# Patient Record
Sex: MALE | Race: BLACK OR AFRICAN AMERICAN | URBAN - METROPOLITAN AREA
[De-identification: names, ages, dates, MRNs, and addresses within clinical notes are randomized per-mention and may not be internally consistent; named-entity substitution may affect disease eponyms.]

---

## 2022-10-19 ENCOUNTER — OFFICE VISIT (OUTPATIENT)
Dept: NEUROLOGY | Age: 72
End: 2022-10-19
Payer: MEDICARE

## 2022-10-19 VITALS
OXYGEN SATURATION: 100 % | SYSTOLIC BLOOD PRESSURE: 111 MMHG | RESPIRATION RATE: 86 BRPM | HEART RATE: 88 BPM | DIASTOLIC BLOOD PRESSURE: 59 MMHG | WEIGHT: 163 LBS | BODY MASS INDEX: 20.27 KG/M2 | HEIGHT: 75 IN

## 2022-10-19 DIAGNOSIS — G20 PARKINSON DISEASE (HCC): Primary | ICD-10-CM

## 2022-10-19 PROCEDURE — 99204 OFFICE O/P NEW MOD 45 MIN: CPT | Performed by: PSYCHIATRY & NEUROLOGY

## 2022-10-19 PROCEDURE — 1123F ACP DISCUSS/DSCN MKR DOCD: CPT | Performed by: PSYCHIATRY & NEUROLOGY

## 2022-10-19 RX ORDER — VALSARTAN 80 MG/1
180 TABLET ORAL DAILY
COMMUNITY
Start: 2022-10-04

## 2022-10-19 RX ORDER — PIOGLITAZONEHYDROCHLORIDE 15 MG/1
15 TABLET ORAL DAILY
COMMUNITY
Start: 2022-10-04

## 2022-10-19 RX ORDER — PRAVASTATIN SODIUM 20 MG/1
20 TABLET ORAL
COMMUNITY
Start: 2022-10-15

## 2022-10-19 RX ORDER — TAMSULOSIN HYDROCHLORIDE 0.4 MG/1
0.4 CAPSULE ORAL 2 TIMES DAILY
COMMUNITY
Start: 2022-10-03

## 2022-10-19 RX ORDER — CARBIDOPA AND LEVODOPA 25; 100 MG/1; MG/1
TABLET ORAL
Qty: 90 TABLET | Refills: 3 | Status: SHIPPED | OUTPATIENT
Start: 2022-10-19

## 2022-10-19 RX ORDER — METFORMIN HYDROCHLORIDE 1000 MG/1
1000 TABLET ORAL 2 TIMES DAILY
COMMUNITY
Start: 2022-10-04

## 2022-10-19 NOTE — PATIENT INSTRUCTIONS
RESULT POLICY      If we have ordered testing for you, know that; \"NO NEWS IS GOOD NEWS! \"     It is our policy that we no longer call patients with results, nor do we  give test results over the phone. We schedule follow up appointments so that your results can be discussed in person. This allows you to address any questions you have regarding the results. If you choose to go to an imaging center outside of [facility], it is your responsibility to bring imaging report and disc to follow up appointment. If something of concern is revealed on your test, we will contact you to discuss the matter and if needed schedule a sooner follow up appointment. Additionally, results may be found by using the My Chart feature and one of our patient service representatives at the  can give you instructions on how to access this feature to utilize our electronic medical record system. Thank you for your understanding. [facility]   Statement to Patients  April 1, 2014      In an effort to ensure the large volume of patient prescription refills is processed in the most efficient and expeditious manner, we are asking our patients to assist us by calling your Pharmacy for all prescription refills, this will include also your  Mail Order Pharmacy. The pharmacy will contact our office electronically to continue the refill process. Please do not wait until the last minute to call your pharmacy. We need at least 48 hours (2days) to fill prescriptions. We also encourage you to call your pharmacy before going to  your prescription to make sure it is ready. With regard to controlled substance prescription refill requests (narcotic refills) that need to be picked up at our office, we ask your cooperation by providing us with at least 72 hours (3days) notice that you will need a refill.     We will not refill narcotic prescription refill requests after 4:00pm on any weekday, Monday through Thursday, or after 2:00pm on Fridays, or on the weekends. We encourage everyone to explore another way of getting your prescription refill request processed using [employer], our patient web portal through our electronic medical record system. [employer] is an efficient and effective way to communicate your medication request directly to the office and  downloadable as an felix on your smart phone . [employer] also features a review functionality that allows you to view your medication list as well as leave messages for your physician. Are you ready to get connected? If so please review the attatched instructions or speak to any of our staff to get you set up right away! Thank you so much for your cooperation. Should you have any questions please contact our Practice Administrator.

## 2022-10-19 NOTE — PROGRESS NOTES
[facility] and [address] at [facility] at [facility] [address], [address] [place] [id number] [address]  [phone] Office  [phone] Facsimile           Referring: Gwynne Buerger, MD      Chief Complaint   Patient presents with    New Patient    Tremors     Mainly right hand for about 1 yr. Has come close to falling at times. Excess drooling, difficulty with word finding or long pauses when speaking     75-year-old gentleman who presents today company by his wife for initial neurologic consultation regarding tremor. For greater than a year now he has noticed tremor in his right hand and shaking. Also having drooling. His wife notes that he actually could feel these before she could actually see them but they have become progressively worse. He has difficulty with buttons and is wearing closed now that do not have buttons for the most part. His writing has become very small. He has become hypophonic. He says he has to work to get out what he wants to say. His wife has noticed a change in his facial expression and she notes that he just appears sad. He walks like he is ready to trip and fall over she says. He is bradykinetic. His wife now has to bathe him and when she tries to get him to help he says that he cannot move as quick as she would like him to do. No medications have been tried. No family history of such. No toxic exposures. No imaging. Past Medical History:   Diagnosis Date    Diabetes ([address]     Essential hypertension     Hyperlipidemia     Kidney stone     Prostate cancer (HCC)     Tremor        Past Surgical History:   Procedure Laterality Date    HX LITHOTRIPSY         Current Outpatient Medications   Medication Sig Dispense Refill    metFORMIN (GLUCOPHAGE) 1,000 mg tablet Take 1,000 mg by mouth two (2) times a day.       pioglitazone (ACTOS) 15 mg tablet Take 15 mg by mouth daily. pravastatin (PRAVACHOL) 20 mg tablet Take 20 mg by mouth nightly. tamsulosin (FLOMAX) 0.4 mg capsule Take 0.4 mg by mouth two (2) times a day. valsartan (DIOVAN) 80 mg tablet Take 180 mg by mouth daily. Not on File    Social History     Tobacco Use    Smoking status: Former     Types: Cigarettes    Smokeless tobacco: Never   Vaping Use    Vaping Use: Never used   Substance Use Topics    Alcohol use: Not Currently    Drug use: Not Currently       History reviewed. No pertinent family history. Review of Systems  Pertinent positives and negatives as noted. Examination  Visit Vitals  BP (!) 111/59 (BP 1 Location: Left upper arm, BP Patient Position: Sitting, BP Cuff Size: Adult)   Pulse 88   Resp (!) 86   Ht 6' 3\" (1.905 m)   Wt 73.9 kg (163 lb)   SpO2 100%   BMI 20.37 kg/m²     He is a pleasant gentleman. He is awake alert and oriented. He is hypophonic speech. He has intact cranial nerves II-XII. No gaze limitation. Facies are masked. He has rest tremor of the right upper extremity with cogwheeling at the wrist.  Cogwheeling also at the left wrist.  He is quite bradykinetic. No pronation and no drift. Strength is full in the upper and lower extremities in all muscle groups to direct testing. Symmetrical reflexes. No pathologic reflex. No ataxia. He does have some drooling. He arises from the examination table unassisted. He is able to walk down the [place] with a fair stride and slight hunch. Tremor is accentuated with ambulation and he has no arm swing. Impression/Plan  77-year-old gentleman with symptoms and examination as above consistent with Parkinson's  We discussed what Parkinson's is and how we treated  Discussed pathophysiology  We will get an MRI of the brain and carotid Doppler to evaluate for potential basal ganglia stroke etc. that could be responsible, i.e vascular Parkinson's.   We will give him a trial of Sinemet titrating to a dose of 25/100, 1 tablet at 8 AM, 12 noon and 4 PM  I will see him back after his testing  Radha Esqueda MD          This note was created using voice recognition software. Despite editing, there may be syntax errors.

## 2022-10-31 ENCOUNTER — HOSPITAL ENCOUNTER (OUTPATIENT)
Dept: MRI IMAGING | Age: 72
Discharge: HOME | End: 2022-10-31
Attending: PSYCHIATRY & NEUROLOGY
Payer: MEDICARE

## 2022-10-31 DIAGNOSIS — G20 PARKINSON DISEASE (HCC): ICD-10-CM

## 2022-10-31 PROCEDURE — 70551 MRI BRAIN STEM W/O DYE: CPT

## 2022-11-04 DIAGNOSIS — R93.89 ABNORMAL MRI: Primary | ICD-10-CM

## 2022-11-04 NOTE — TELEPHONE ENCOUNTER
Referral sent to Dr. Linda Diallo due to significant sinus disease per request and written order from Dr. Claire Pickard on 11/4/22. Ordered MRI of cervical spine with and without due to nonspecific marrow heterogeneity at C4 vertebral body per the written request of Dr. Claire Pickard on 11/4/22     Pls tell patient he has significant sinus disease on MRI--refer to Dr. Héctor Wyman, ENT     He also has an area seen on cervical spine that radiologist would like a better look--order MRI cervical with and without     Thanks     SE     Called and spoke with both patient and his wife concerning the orders placed and discussed per Dr. Claire Pickard note. Patient stated understanding.

## 2022-11-22 ENCOUNTER — OFFICE VISIT (OUTPATIENT)
Dept: NEUROLOGY | Age: 72
End: 2022-11-22
Payer: MEDICARE

## 2022-11-22 VITALS — RESPIRATION RATE: 20 BRPM | SYSTOLIC BLOOD PRESSURE: 110 MMHG | DIASTOLIC BLOOD PRESSURE: 76 MMHG

## 2022-11-22 DIAGNOSIS — G20 PARKINSON DISEASE (HCC): Primary | ICD-10-CM

## 2022-11-22 PROCEDURE — 99213 OFFICE O/P EST LOW 20 MIN: CPT

## 2022-11-22 PROCEDURE — 1123F ACP DISCUSS/DSCN MKR DOCD: CPT

## 2022-11-22 NOTE — PROGRESS NOTES
Simona Cuevas is a 67 y.o. male who presents with the following  Chief Complaint   Patient presents with    Follow-up     Test results        HPI  Patient here with his wife for Parkinson's follow-up. Patient last seen by Dr. Sade Leiva on October 19, 2022 for tremors mainly in the right hand for about a year, balance issues, excess drooling, difficulty with word finding, small writing, bradykinetic, and masked facies. After seeing this patient Dr. Sade Leiva ordered an MRI of the brain and carotid Dopplers. Carotid Dopplers showed less than 50% stenosis on the right and left. The MRI showed:    1. Chronic microvascular ischemic disease with no acute infarction. 2. Heterogeneous signal fills the left maxillary sinus, maxillary ostium and ethmoid sinus. Could be better assessed with dedicated sinus CT as clinically indicated. 3. Nonspecific marrow heterogeneity C4 vertebral body partly visualized. This could be better assessed with dedicated cervical spine MRI on a non emergent basis. Due to these results Dr. Sade Leiva had his nurse refer the patient to Dr. Army Mckeon for significant sinus disease and placed an order for a c spine MRI due to the heterogeneous findings. Patient's wife states that neither one of these have been scheduled yet but that they are aware of them. Patient has been taking the Sinemet as prescribed and he and his wife both feel that the medication is certainly helping him. They feel there has been an improvement in his overall stiffness, desire and motivation to do things, excessive drooling, and word finding. His wife says that he has actually initiated calling his sons on the phone again. He has not had any side effects to the medication. They state that his right hand tremor is about the same as it was before starting the medication. Patient's wife is interested in him starting some physical therapy.   [facility] Parkinson's patients and she was very interested in this for him. Not on File    Current Outpatient Medications   Medication Sig    metFORMIN (GLUCOPHAGE) 1,000 mg tablet Take 1,000 mg by mouth two (2) times a day. pioglitazone (ACTOS) 15 mg tablet Take 15 mg by mouth daily. tamsulosin (FLOMAX) 0.4 mg capsule Take 0.4 mg by mouth two (2) times a day. valsartan (DIOVAN) 80 mg tablet Take 180 mg by mouth daily. carbidopa-levodopa (Sinemet)  mg per tablet 1/2 tab po at 8am, 12noon and 4pm x 1 week then 1 tab po at same intervals thereafter (Patient taking differently: Take 1 Tablet by mouth three (3) times daily. 1 tab po at same intervals thereafter)    pravastatin (PRAVACHOL) 20 mg tablet Take 20 mg by mouth nightly. No current facility-administered medications for this visit. Social History     Tobacco Use   Smoking Status Former    Types: Cigarettes   Smokeless Tobacco Never       Past Medical History:   Diagnosis Date    Diabetes ([facility] [address]     Essential hypertension     Hyperlipidemia     Kidney stone     Prostate cancer ([facility] [address]     Tremor        Past Surgical History:   Procedure Laterality Date    HX LITHOTRIPSY         History reviewed. No pertinent family history. Social History     Socioeconomic History    Marital status:    Tobacco Use    Smoking status: Former     Types: Cigarettes    Smokeless tobacco: Never   Vaping Use    Vaping Use: Never used   Substance and Sexual Activity    Alcohol use: Not Currently    Drug use: Not Currently       Review of Systems   Constitutional: Negative. Musculoskeletal:  Negative for falls. Neurological:  Positive for tremors. Remainder of comprehensive review is negative. Physical Exam :    Visit Vitals  /76 (BP 1 Location: Left upper arm, BP Patient Position: Sitting, BP Cuff Size: Adult)   Resp 20       General: Well defined, nourished, and groomed individual in no acute distress.     Neck: Supple, nontender, no bruits, no pain with resistance to active range of motion. Musculoskeletal: Extremities revealed no edema and had full range of motion of joints. Psych: Good mood and bright affect    NEUROLOGICAL EXAMINATION:    Mental Status: Alert and oriented to person, place, and time    Cranial Nerves:    II, III, IV, VI: Visual acuity grossly intact. Visual fields are normal.    Pupils are equal, round, and reactive to light and accommodation. Extra-ocular movements are full and fluid. Fundoscopic exam was benign, no ptosis or nystagmus. V-XII: Hearing is grossly intact. Facial features are symmetric, with normal sensation and strength. The palate rises symmetrically and the tongue protrudes midline. Motor Examination: Normal tone, bulk, and strength, 5/5 muscle strength throughout. Coordination: Finger to nose was normal. Right hand resting tremor. Gait and Station: Did not observe  Reflexes: DTRs 2+ throughout. No results found for this or any previous visit. Orders Placed This Encounter    REFERRAL TO PHYSICAL THERAPY     Referral Priority:   Routine     Referral Type:   PT/OT/ST     Referral Reason:   Specialty Services Required     Requested Specialty:   Physical Therapy     Number of Visits Requested:   1       1. Parkinson disease ([facility] [address]    Will refer to physical therapy [facility] program for Parkinson's patients. Continue taking Sinemet 25/100 mg at 8, 12, 4 each day. Follow-up after physical therapy is complete.           This note will not be viewable in [employer]

## 2022-11-22 NOTE — PROGRESS NOTES
Has helped, even the drooling, and desire to do different things, except for talking on the phone   Maybe even his mood slighly

## 2023-02-14 RX ORDER — CARBIDOPA AND LEVODOPA 25; 100 MG/1; MG/1
TABLET ORAL
Qty: 90 TABLET | Refills: 3 | Status: SHIPPED | OUTPATIENT
Start: 2023-02-14

## 2023-02-15 ENCOUNTER — HOSPITAL ENCOUNTER (OUTPATIENT)
Dept: MRI IMAGING | Age: 73
Discharge: HOME | End: 2023-02-15
Attending: PSYCHIATRY & NEUROLOGY
Payer: MEDICARE

## 2023-02-15 DIAGNOSIS — R93.89 ABNORMAL MRI: ICD-10-CM

## 2023-02-15 PROCEDURE — 72156 MRI NECK SPINE W/O & W/DYE: CPT

## 2023-02-15 PROCEDURE — A9576 INJ PROHANCE MULTIPACK: HCPCS | Performed by: PSYCHIATRY & NEUROLOGY

## 2023-02-15 PROCEDURE — 74011250636 HC RX REV CODE- 250/636: Performed by: PSYCHIATRY & NEUROLOGY

## 2023-02-15 RX ADMIN — GADOTERIDOL 15 ML: 279.3 INJECTION, SOLUTION INTRAVENOUS at 10:59

## 2023-04-06 ENCOUNTER — OFFICE VISIT (OUTPATIENT)
Dept: NEUROLOGY | Age: 73
End: 2023-04-06
Payer: MEDICARE

## 2023-04-06 PROCEDURE — 1101F PT FALLS ASSESS-DOCD LE1/YR: CPT

## 2023-04-06 PROCEDURE — 99214 OFFICE O/P EST MOD 30 MIN: CPT

## 2023-04-06 PROCEDURE — 3017F COLORECTAL CA SCREEN DOC REV: CPT

## 2023-04-06 PROCEDURE — 1123F ACP DISCUSS/DSCN MKR DOCD: CPT

## 2023-04-06 PROCEDURE — G8536 NO DOC ELDER MAL SCRN: HCPCS

## 2023-04-06 PROCEDURE — G8427 DOCREV CUR MEDS BY ELIG CLIN: HCPCS

## 2023-04-06 PROCEDURE — G8432 DEP SCR NOT DOC, RNG: HCPCS

## 2023-04-06 PROCEDURE — G8420 CALC BMI NORM PARAMETERS: HCPCS

## 2023-04-06 NOTE — PROGRESS NOTES
Lidia Engel is a 67 y.o. male who presents with the following  Chief Complaint   Patient presents with    Follow-up     After therapy and medication evaluation      Last office visit note  HPI  Patient here with his wife for Parkinson's follow-up. Patient last seen by Dr. Morales Steinberg on October 19, 2022 for tremors mainly in the right hand for about a year, balance issues, excess drooling, difficulty with word finding, small writing, bradykinetic, and masked facies. After seeing this patient Dr. Morales Steinberg ordered an MRI of the brain and carotid Dopplers. Carotid Dopplers showed less than 50% stenosis on the right and left. The MRI showed:     1. Chronic microvascular ischemic disease with no acute infarction. 2. Heterogeneous signal fills the left maxillary sinus, maxillary ostium and ethmoid sinus. Could be better assessed with dedicated sinus CT as clinically indicated. 3. Nonspecific marrow heterogeneity C4 vertebral body partly visualized. This could be better assessed with dedicated cervical spine MRI on a non emergent basis. Due to these results Dr. Morales Steinberg had his nurse refer the patient to Dr. Jeanna Lin for significant sinus disease and placed an order for a c spine MRI due to the heterogeneous findings. Patient's wife states that neither one of these have been scheduled yet but that they are aware of them. Patient has been taking the Sinemet as prescribed and he and his wife both feel that the medication is certainly helping him. They feel there has been an improvement in his overall stiffness, desire and motivation to do things, excessive drooling, and word finding. His wife says that he has actually initiated calling his sons on the phone again. He has not had any side effects to the medication. They state that his right hand tremor is about the same as it was before starting the medication. Patient's wife is interested in him starting some physical therapy.   Discussed with them the [facility] big and loud program for Parkinson's patients and she was very interested in this for him. Will refer to physical therapy specifically [facility] big and loud program for Parkinson's patients. Continue taking Sinemet 25/100 mg at 8, 12, 4 each day. Follow-up after physical therapy is complete. Today's office visit note  HPI:  Patient is here today with his wife for Parkinson's follow-up. Patient's wife states that the patient is still seeing the improvement with her 's condition she states that especially there is less drooling than there was before. She even feels that his tremors are a little better than they used to be. Patient is tolerating the Sinemet well although he is having vivid dreams, they seem to be good dreams as he is laughing during them. Since their last visit here patient had an MRI of the C-spine that Dr. Courtney Martinez ordered that showed:  IMPRESSION  1. Multilevel degenerative change detailed by level above most significant at C4-5 and C5-6  2. Marrow signal is heterogeneous with endplate degenerative change but no marrow replacement     Patient never did get to physical therapy. Wife stated that she was never contacted by [facility]. She also wanted to wait until they did the MRI of the cervical spine before he participated in therapy. Patient would like a copy of the cervical MRI to take to physical therapy when he does start. Patient wanted to know if his dose of Sinemet could be increased at his visit today. Not on File    Current Outpatient Medications   Medication Sig    carbidopa-levodopa (SINEMET)  mg per tablet TAKE 1 TAB BY MOUTH AT 8AM, 12NOON, 4PM, 8PM  Indications: a type of movement disorder called parkinsonism    metFORMIN (GLUCOPHAGE) 1,000 mg tablet Take 1 Tablet by mouth two (2) times a day. pioglitazone (ACTOS) 15 mg tablet Take 1 Tablet by mouth daily. pravastatin (PRAVACHOL) 20 mg tablet Take 1 Tablet by mouth nightly. tamsulosin (FLOMAX) 0.4 mg capsule Take 1 Capsule by mouth two (2) times a day. valsartan (DIOVAN) 80 mg tablet Take 180 mg by mouth daily. No current facility-administered medications for this visit. Social History     Tobacco Use   Smoking Status Former    Types: Cigarettes   Smokeless Tobacco Never       Past Medical History:   Diagnosis Date    Diabetes ([facility] [address]     Essential hypertension     Hyperlipidemia     Kidney stone     Prostate cancer ([facility] [address]     Tremor        Past Surgical History:   Procedure Laterality Date    HX LITHOTRIPSY         No family history on file. Social History     Socioeconomic History    Marital status:    Tobacco Use    Smoking status: Former     Types: Cigarettes    Smokeless tobacco: Never   Vaping Use    Vaping Use: Never used   Substance and Sexual Activity    Alcohol use: Not Currently    Drug use: Not Currently       Review of Systems   Musculoskeletal:  Negative for falls. Neurological:  Positive for tremors. Remainder of comprehensive review is negative. Physical Exam :    Visit Vitals  /66 (BP 1 Location: Left upper arm, BP Patient Position: Sitting, BP Cuff Size: Adult)   Pulse 89   Temp 97.1 °F (36.2 °C)   Resp 20   SpO2 97%       General: Well defined, nourished, and groomed individual in no acute distress. Neck: Supple, nontender, no bruits, no pain with resistance to active range of motion. Musculoskeletal: Extremities revealed no edema and had full range of motion of joints. Psych: Good mood and bright affect    NEUROLOGICAL EXAMINATION:    Mental Status: Alert and oriented to person, place, and time    Cranial Nerves:    II, III, IV, VI: Visual acuity grossly intact. Visual fields are normal.    Pupils are equal, round, and reactive to light and accommodation. Extra-ocular movements are full and fluid. Fundoscopic exam was benign, no ptosis or nystagmus. V-XII: Hearing is grossly intact.  Facial features are symmetric, with normal sensation and strength. The palate rises symmetrically and the tongue protrudes midline. Sternocleidomastoids 5/5. Motor Examination: Normal tone, bulk, and strength, 5/5 muscle strength throughout. Coordination: Finger to nose was normal. Right hand resting tremor. Gait and Station: Steady while walking. Normal arm swing. No pronator drift. No muscle wasting or fasiculations noted. Reflexes: DTRs 1+ throughout. No results found for this or any previous visit. Orders Placed This Encounter    carbidopa-levodopa (SINEMET)  mg per tablet     Sig: TAKE 1 TAB BY MOUTH AT 8AM, 12NOON, 4PM, 8PM  Indications: a type of movement disorder called parkinsonism     Dispense:  120 Tablet     Refill:  3       1. Parkinson disease ([facility] [address]      Per patient's request will increase the Sinemet dose from  mg by adding an 8:00 PM dose to his regimen. Patient will now take a dose at 8 AM 12 PM 4 PM and 8 PM.    We will resend the physical therapy referral to [facility] so that the patient can get into physical therapy ASAP. Advised the patient to return in about 3 months to assess the medication increase. Advised the patient's wife that if there is any issue with the medication in the meantime to send a [employer] message to let us know.           This note will not be viewable in [employer]

## 2023-04-06 NOTE — PROGRESS NOTES
Stated he never got a call for the therapy   Still taking the sinemet, tremors have almost stayed the same, drooling is better according to his wife, now he only notices it while he is eating and he will drool when he is asleep

## 2023-05-17 RX ORDER — PIOGLITAZONEHYDROCHLORIDE 15 MG/1
15 TABLET ORAL DAILY
COMMUNITY
Start: 2022-10-04

## 2023-05-17 RX ORDER — VALSARTAN 80 MG/1
180 TABLET ORAL DAILY
COMMUNITY
Start: 2022-10-04

## 2023-05-17 RX ORDER — TAMSULOSIN HYDROCHLORIDE 0.4 MG/1
0.4 CAPSULE ORAL 2 TIMES DAILY
COMMUNITY
Start: 2022-10-03

## 2023-05-17 RX ORDER — PRAVASTATIN SODIUM 20 MG
1 TABLET ORAL NIGHTLY
COMMUNITY
Start: 2022-10-15

## 2023-07-05 NOTE — PROGRESS NOTES
know.    Today's office visit note  HPI  Patient is here today with his wife for Parkinson's follow-up. At the last visit, the patient had seen improvement with Sinemet  times a day however wanted an increase in the medication to see if things could be better. We added an 8 PM dose to his regimen and today the patient and his wife feel that things are about the same even with the increase in the dose. They feel that things are no better but also no worse. Patient feels as if he has an increase in drooling with increase in water intake and still has a mild right hand tremor at rest.  Patient's wife is still seeing an improvement in motivation and word finding. He has not had any falls. He did not have physical therapy yet as they state that it has been a busy time for them but they are still interested and will give [facility] a call to get scheduled. No Known Allergies    Current Outpatient Medications   Medication Sig Dispense Refill    carbidopa-levodopa (SINEMET)  MG per tablet Take 1 1/2 pills 8 am, 12 pm, 4 pm and 1 pill at 8 pm daily 210 tablet 2    metFORMIN (GLUCOPHAGE) 1000 MG tablet Take 1 tablet by mouth 2 times daily      pioglitazone (ACTOS) 15 MG tablet Take 1 tablet by mouth daily      pravastatin (PRAVACHOL) 20 MG tablet Take 1 tablet by mouth nightly      tamsulosin (FLOMAX) 0.4 MG capsule Take 1 capsule by mouth 2 times daily      valsartan (DIOVAN) 80 MG tablet Take 2.25 tablets by mouth daily       No current facility-administered medications for this visit. Social History     Tobacco Use   Smoking Status Former   Smokeless Tobacco Never       Past Medical History:   Diagnosis Date    Diabetes ([address])     Essential hypertension     Hyperlipidemia     Kidney stone     Prostate cancer ([address])     Tremor        Past Surgical History:   Procedure Laterality Date    LITHOTRIPSY         No family history on file.     Social History     Socioeconomic History    Marital status:

## 2023-07-06 ENCOUNTER — OFFICE VISIT (OUTPATIENT)
Age: 73
End: 2023-07-06
Payer: MEDICARE

## 2023-07-06 VITALS
HEIGHT: 74 IN | DIASTOLIC BLOOD PRESSURE: 64 MMHG | WEIGHT: 163 LBS | SYSTOLIC BLOOD PRESSURE: 101 MMHG | HEART RATE: 73 BPM | BODY MASS INDEX: 20.92 KG/M2 | OXYGEN SATURATION: 97 % | RESPIRATION RATE: 18 BRPM

## 2023-07-06 DIAGNOSIS — G20 PARKINSON'S DISEASE (HCC): Primary | ICD-10-CM

## 2023-07-06 PROCEDURE — 4004F PT TOBACCO SCREEN RCVD TLK: CPT

## 2023-07-06 PROCEDURE — 99214 OFFICE O/P EST MOD 30 MIN: CPT

## 2023-07-06 PROCEDURE — 3017F COLORECTAL CA SCREEN DOC REV: CPT

## 2023-07-06 PROCEDURE — 1123F ACP DISCUSS/DSCN MKR DOCD: CPT

## 2023-07-06 PROCEDURE — G8420 CALC BMI NORM PARAMETERS: HCPCS

## 2023-07-06 PROCEDURE — G8427 DOCREV CUR MEDS BY ELIG CLIN: HCPCS

## 2023-07-06 ASSESSMENT — PATIENT HEALTH QUESTIONNAIRE - PHQ9
SUM OF ALL RESPONSES TO PHQ QUESTIONS 1-9: 2
SUM OF ALL RESPONSES TO PHQ QUESTIONS 1-9: 2
2. FEELING DOWN, DEPRESSED OR HOPELESS: 1
SUM OF ALL RESPONSES TO PHQ QUESTIONS 1-9: 2
1. LITTLE INTEREST OR PLEASURE IN DOING THINGS: 1
SUM OF ALL RESPONSES TO PHQ QUESTIONS 1-9: 2
SUM OF ALL RESPONSES TO PHQ9 QUESTIONS 1 & 2: 2

## 2023-09-07 DIAGNOSIS — G20 PARKINSON'S DISEASE (HCC): ICD-10-CM

## 2023-09-20 NOTE — PROGRESS NOTES
Susan Davalos is a 68 y.o. male who presents with the following  Chief Complaint   Patient presents with    Follow-up     Med gavin     Last office visit note  HPI  Patient is here today with his wife for Parkinson's follow-up. At the last visit, the patient had seen improvement with Sinemet  times a day however wanted an increase in the medication to see if things could be better. We added an 8 PM dose to his regimen and today the patient and his wife feel that things are about the same even with the increase in the dose. They feel that things are no better but also no worse. Patient feels as if he has an increase in drooling with increase in water intake and still has a mild right hand tremor at rest.  Patient's wife is still seeing an improvement in motivation and word finding. He has not had any falls. He did not have physical therapy yet as they state that it has been a busy time for them but they are still interested and will give [facility] a call to get scheduled. Will increase the patient's sentiment today from  mg 4 times a day to 1-1/2 tablets at 8 AM, 12 PM, 4 PM and 1 pill still at 8 PM.     Advised the patient and his wife to get in touch with [facility] for physical therapy. Follow-up in about 10 weeks for medication evaluation    Today's office visit note  HPI  Patient is here today with his wife for Parkinson's follow-up. Since the patient was here last he increase his Sinemet to 1-1/2 pills 3 times a day and 1 pill at 8 PM.  He has still not had any physical therapy for his weakness. Today the patient and his wife feel that after the increase of the medicine there is really still no change. Patient's wife states that the tremor in his right hand is worse in the morning and throughout the day if he is upset about something will increase. He is still having stiffness and sometimes feeling as though he is frozen especially when trying to sit down.

## 2023-09-21 ENCOUNTER — OFFICE VISIT (OUTPATIENT)
Age: 73
End: 2023-09-21
Payer: MEDICARE

## 2023-09-21 VITALS
OXYGEN SATURATION: 98 % | SYSTOLIC BLOOD PRESSURE: 124 MMHG | RESPIRATION RATE: 20 BRPM | DIASTOLIC BLOOD PRESSURE: 68 MMHG | HEART RATE: 85 BPM

## 2023-09-21 DIAGNOSIS — G20 PARKINSON'S DISEASE (HCC): Primary | ICD-10-CM

## 2023-09-21 PROCEDURE — 1123F ACP DISCUSS/DSCN MKR DOCD: CPT

## 2023-09-21 PROCEDURE — 3017F COLORECTAL CA SCREEN DOC REV: CPT

## 2023-09-21 PROCEDURE — 1036F TOBACCO NON-USER: CPT

## 2023-09-21 PROCEDURE — G8427 DOCREV CUR MEDS BY ELIG CLIN: HCPCS

## 2023-09-21 PROCEDURE — 99214 OFFICE O/P EST MOD 30 MIN: CPT

## 2023-09-21 PROCEDURE — G8420 CALC BMI NORM PARAMETERS: HCPCS

## 2023-09-21 NOTE — PROGRESS NOTES
No changes since his last visit   Certain days he looks real weak first thing in the morning but that is not every morning

## 2024-01-10 NOTE — PROGRESS NOTES
Christopher Whyte is a 73 y.o. male who presents with the following  Chief Complaint   Patient presents with    Follow-up     Med eval increased sinemet     Last office visit note  HPI  Patient is here today with his wife for Parkinson's follow-up.  Since the patient was here last he increase his Sinemet to 1-1/2 pills 3 times a day and 1 pill at 8 PM.  He has still not had any physical therapy for his weakness.     Today the patient and his wife feel that after the increase of the medicine there is really still no change.  Patient's wife states that the tremor in his right hand is worse in the morning and throughout the day if he is upset about something will increase.  He is still having stiffness and sometimes feeling as though he is frozen especially when trying to sit down.  Patient's wife states that he is very weak and struggles to get in and out of the bed.  He has had 1 fall since his last visit.  He says that he feels dizzy sometimes upon standing and if he is on his feet for a long period of time.  Patient's wife says that she feels like he is still doing very well with being social and having motivation.    Reiterated how important it is for the patient to have physical therapy for his weakness.  Patient's wife states that she agrees with this.  Not sure that the patient really feels that he wants to do it.  The referral that I made for [facility] in April for the [facility] program will be refaxed to them today so that he can build up his strength.     Spoke with Cesar Jmienez NP about this patient and he suggested that we increase his Sinemet to 2 pills 4 times a day to see if this helps with his stiffness, freezing,and tremor.     We will have the patient follow-up in 3 months to see how he is feeling with this change.      Today's office visit note  HPI  Patient is here today with his wife for Parkinson's follow-up.  Since the patient was here last, they increase the Sinemet dose to

## 2024-01-11 ENCOUNTER — OFFICE VISIT (OUTPATIENT)
Age: 74
End: 2024-01-11
Payer: MEDICARE

## 2024-01-11 VITALS
OXYGEN SATURATION: 94 % | DIASTOLIC BLOOD PRESSURE: 68 MMHG | SYSTOLIC BLOOD PRESSURE: 118 MMHG | RESPIRATION RATE: 20 BRPM

## 2024-01-11 DIAGNOSIS — G20.A1 PARKINSON'S DISEASE WITHOUT DYSKINESIA OR FLUCTUATING MANIFESTATIONS: Primary | ICD-10-CM

## 2024-01-11 PROCEDURE — G8484 FLU IMMUNIZE NO ADMIN: HCPCS

## 2024-01-11 PROCEDURE — 1123F ACP DISCUSS/DSCN MKR DOCD: CPT

## 2024-01-11 PROCEDURE — 1036F TOBACCO NON-USER: CPT

## 2024-01-11 PROCEDURE — G8420 CALC BMI NORM PARAMETERS: HCPCS

## 2024-01-11 PROCEDURE — G8428 CUR MEDS NOT DOCUMENT: HCPCS

## 2024-01-11 PROCEDURE — 99214 OFFICE O/P EST MOD 30 MIN: CPT

## 2024-01-11 PROCEDURE — 3017F COLORECTAL CA SCREEN DOC REV: CPT

## 2024-01-11 NOTE — PROGRESS NOTES
Drooling, and his social interaction has improved a whole lot   Overall over the length of time he has been coming she has noticed a difference in the medication   Not necessarily since the last visit just overall

## 2024-04-09 DIAGNOSIS — G20.A1 PARKINSON'S DISEASE (HCC): ICD-10-CM

## 2024-05-01 DIAGNOSIS — G20.A1 PARKINSON'S DISEASE (HCC): ICD-10-CM

## 2024-10-24 NOTE — PROGRESS NOTES
Christopher Whyte is a 74 y.o. male who presents with the following  Chief Complaint   Patient presents with    Follow-up     Parkinson's disease      Last office visit note  HPI  Patient is here today with his wife for Parkinson's follow-up.  Since the patient was here last, they increase the Sinemet dose to  mg 2 pills 4 times a day.  Patient says that he does not see any significant improvement in his symptoms however the patient's wife says that overall since he has been coming here being treated with the Sinemet she has seen a big change in his symptoms mostly including less drooling and with being more social.  The patient says he still feels very stiff all of the time and has trouble getting in and out of the bed still.  At his last visit we reiterated that he needed physical therapy and today the patient's wife states that the were able to attend about 4 or 5 PT sessions and was assessed by speech and was about to have an OT evaluation when the patient unfortunately had a serious UTI infection which stopped the visits.  The patient's wife says that the patient was doing well in physical therapy and enjoyed his visits.  She would like to get him reestablished soon.  Patient's wife says that physical therapy found that the patient's blood pressure was dropping while he was standing and that is likely the reason of his falls.  Since then, his doctor  stopped his valsartan about a month ago and he has not had any further falls.  Patient's wife says that since being on the sinemet the patient has been having vivid dreams.  She says sometimes he is laughing and they seem to be good dreams and sometimes he is thrashing around.  The patient states that he does not remember these dreams or any of this happening. He has prostate CA and is up a lot during the night to use the bathroom.     Patient will continue to take Sinemet  mg 2 pills 4 times a day at this time.     Urged the patient to get back into

## 2024-10-25 ENCOUNTER — OFFICE VISIT (OUTPATIENT)
Age: 74
End: 2024-10-25

## 2024-10-25 VITALS
SYSTOLIC BLOOD PRESSURE: 146 MMHG | OXYGEN SATURATION: 96 % | DIASTOLIC BLOOD PRESSURE: 84 MMHG | HEART RATE: 64 BPM | RESPIRATION RATE: 20 BRPM

## 2024-10-25 DIAGNOSIS — G20.A1 PARKINSON'S DISEASE WITHOUT DYSKINESIA OR FLUCTUATING MANIFESTATIONS (HCC): Primary | ICD-10-CM

## 2024-10-25 RX ORDER — FLUDROCORTISONE ACETATE 0.1 MG/1
0.1 TABLET ORAL
COMMUNITY
Start: 2024-01-27

## 2024-10-25 RX ORDER — CARBIDOPA AND LEVODOPA 25; 100 MG/1; MG/1
2 TABLET, EXTENDED RELEASE ORAL NIGHTLY
Qty: 60 TABLET | Refills: 3 | Status: SHIPPED | OUTPATIENT
Start: 2024-10-25

## 2024-10-25 RX ORDER — FINASTERIDE 5 MG/1
5 TABLET, FILM COATED ORAL DAILY
COMMUNITY
Start: 2024-09-20

## 2024-10-25 NOTE — PROGRESS NOTES
Parkinsons disease- had a couple of falls since last appt- one back in January BP dropping when he was standing admitted him to [facility]   PT/OT evaluated him while he was there     Confusion more in the morning until the 8 am dose   Only time he gets confused according to his wife   Falling has improved

## 2024-10-26 DIAGNOSIS — G20.A1 PARKINSON'S DISEASE (HCC): ICD-10-CM

## 2024-10-27 RX ORDER — CARBIDOPA AND LEVODOPA 25; 100 MG/1; MG/1
TABLET ORAL
Qty: 720 TABLET | Refills: 1 | Status: SHIPPED | OUTPATIENT
Start: 2024-10-27

## 2024-10-30 DIAGNOSIS — G20.A1 PARKINSON'S DISEASE (HCC): ICD-10-CM

## 2025-01-24 RX ORDER — CARBIDOPA AND LEVODOPA 25; 100 MG/1; MG/1
TABLET ORAL
Qty: 630 TABLET | OUTPATIENT
Start: 2025-01-24

## 2025-03-20 DIAGNOSIS — G20.A1 PARKINSON'S DISEASE: ICD-10-CM

## 2025-03-21 RX ORDER — CARBIDOPA AND LEVODOPA 25; 100 MG/1; MG/1
TABLET ORAL
Qty: 720 TABLET | Refills: 1 | Status: SHIPPED | OUTPATIENT
Start: 2025-03-21

## 2025-05-19 ENCOUNTER — OFFICE VISIT (OUTPATIENT)
Age: 75
End: 2025-05-19
Payer: MEDICARE

## 2025-05-19 VITALS
OXYGEN SATURATION: 96 % | HEART RATE: 68 BPM | SYSTOLIC BLOOD PRESSURE: 134 MMHG | RESPIRATION RATE: 20 BRPM | DIASTOLIC BLOOD PRESSURE: 80 MMHG

## 2025-05-19 DIAGNOSIS — G20.A1 PARKINSON'S DISEASE WITHOUT DYSKINESIA OR FLUCTUATING MANIFESTATIONS (HCC): ICD-10-CM

## 2025-05-19 DIAGNOSIS — R41.3 MEMORY CHANGES: ICD-10-CM

## 2025-05-19 DIAGNOSIS — C61 PROSTATE CANCER (HCC): Primary | ICD-10-CM

## 2025-05-19 PROCEDURE — 1159F MED LIST DOCD IN RCRD: CPT

## 2025-05-19 PROCEDURE — G8421 BMI NOT CALCULATED: HCPCS

## 2025-05-19 PROCEDURE — G8427 DOCREV CUR MEDS BY ELIG CLIN: HCPCS

## 2025-05-19 PROCEDURE — 1126F AMNT PAIN NOTED NONE PRSNT: CPT

## 2025-05-19 PROCEDURE — 99215 OFFICE O/P EST HI 40 MIN: CPT

## 2025-05-19 PROCEDURE — 1036F TOBACCO NON-USER: CPT

## 2025-05-19 PROCEDURE — 3017F COLORECTAL CA SCREEN DOC REV: CPT

## 2025-05-19 PROCEDURE — 1160F RVW MEDS BY RX/DR IN RCRD: CPT

## 2025-05-19 PROCEDURE — 1123F ACP DISCUSS/DSCN MKR DOCD: CPT

## 2025-05-19 RX ORDER — DONEPEZIL HYDROCHLORIDE 5 MG/1
5 TABLET, FILM COATED ORAL NIGHTLY
Qty: 30 TABLET | Refills: 3 | Status: SHIPPED | OUTPATIENT
Start: 2025-05-19

## 2025-05-19 NOTE — PROGRESS NOTES
The long acting sinemet didn't help at bedtime     
  Urology     Number of Visits Requested:   1    donepezil (ARICEPT) 5 MG tablet     Sig: Take 1 tablet by mouth nightly     Dispense:  30 tablet     Refill:  3        1. Prostate cancer (HCC)    2. Parkinson's disease without dyskinesia or fluctuating manifestations (HCC)    3. Memory changes    Patient's wife tells me that the patient was being seen at [facility] but his doctor retired.  She says she does not really care for [facility] and wonders if [facility] has a urology group that could see him.  Did give the patient a referral to [facility] in [place].  Suggested that the patient's wife talk to them about an indwelling catheter since he is having such difficulty with frequent urination and trouble sleeping.    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  Discussed potential side effects and the patient and his wife agree.    Will have the patient back in 3 months to see how he is doing with the addition of Aricept.    [place]rence on time note  I spent 53 minutes today in record review, image review, face-to-face interaction and documentation with this patient and his wifeReturn in about 3 months (around 8/19/2025).